# Patient Record
(demographics unavailable — no encounter records)

---

## 2024-12-13 NOTE — REASON FOR VISIT
[Follow-Up - Clinic] : a clinic follow-up of [FreeTextEntry2] :  vascular follow up [FreeTextEntry1] : 12/13/2024   She reports she was told she was pregnant end of june.   Had a miscarrage  with vaginal bleeding Bled for 17 days Was not seen by GYN Currently not pregnant.   She is taking aspirin sometimes Breathing is short - on and off.  She has chest pains at times.  She has leg swelling She is traveling [place] by airplane to help with her dad - he is sick with cancer SHe takes eliquis prior to the flights.    6/13/2024 Has been on aspirin 81mg weekly  no other meds She had a fall April 21st, was coming out of the tub , put out her left foot first, fell, broke her fall with the LEFT hand L wrist started to swell Went to [facility] ER, xray was done of the L wrist, was found to have a fracture. She is going to see a hand surgeon tomorrow She was placed in a brace, which she has been wearing for 6 weeks Pain is better  A door hit her in the R anterior chest  Taking tylenol for pain She took a flight May 31, to [place], took 1 tablet Eliquis 2.5mg 1 :45 prior.  She got on the plane, when it took off, she felt burning in the chest 3 days in [place], she was fine  Then flight back she experienced the pain again. Went to [facility] ER.  - She had an ECG, and xray.   She had labs which were ok .  CT was not done Breathing is ok   june 4 labs ck 96 trop 0.012 dimer <150  12/8/2023  Dong well walking no ore brace on eliquis 2.5mg BID Venous duplex Dec 6th showed no DVT no travel plans   9/8  Denies C/P or SOB. No LE pain other than L ankle discomfort and swelling, stable. No longer in a brace. Ambulating well.   3/10/2023 Followup visit Duplex Feb 2023:  No DVT Echo July 2022:  Normal LV function  Remains on eliquis 5mg BID She is STILL in a immobilization boot on the left She follows with orthopedics in [place]. No blood in the urine or stool No skipping any doses.  She has tenderness to palpation of the chest.    11/29/22  Reports breathing improved. Reports tenderness to chest wall.  Reports decreased swelling and pain to L ankle.  Still wearing L ace wrap and brace. No blood in urine or stool.  Anticardiolipin IgM less than 12 in August. D-dimer and cardiac biomarkers negative in August.  LE venous duplex in 8/2022 showed no acute DVT. Chronic changes in L peroneal vein.  Reports she is going to have an MRI with orthopedics in the near future.  Depending on findings she may need elective ankle surgery.  Medication: Eliquis 5 mg BID  8/26  R sided intermittent C/P, independent of rest or activity. Non-radiating. On this past Monday, lasted for 20 minutes before resolving. Reports at times happens when sleeping, waking her. Notes fatigue. Reports dyspnea on exertion on ambulation of less than 1 block. Reports dyspnea unchanged since last visit.  Denies any bleeding issues on Eliquis. This month menstruation, last 11 days, longer than usual.  Reports L ankle pain and swelling pain improved since last visit. Has started Physical Therapy. Has a visit with Orthopedics next week.    6/28  41 y/o F PMH of Chronic Abdominal Pain with recent L LE injury presented with SOB and found to have LLE DVT (left popliteal veins and tibioperoneal trunk) and  saddle PE. patient initially started on heparin gtt later transitioned to Eliquis with run-in. Echo showed Right ventricular enlargement with normal right ventricular systolic function, borderline PA pressures. Biomarkers were negative at [facility] abdomen showed no thrombus in iliac veins but test was incomplete because patient could not tolerate further.. DVT/PE likely provoked in setting of OCP (Xulane Patch) use and ankle injury. MRI ankle showed high-grade tears of the ATFL and CFL and sprain of the deltoid ligament with a crescentic hematoma overlying the distal fibula. patient evaluated by ortho and recommended no surgical intervention and an ankle brace/aircast with use of crutches for mobility. Patient was admitted on 6/9 and discharged on 6/15.   Currently no C/P.  Reports her JOHNSON is markedly improved post discharge.   JOHNSON on exertion noted on climbing 4 steps (improving). In L ankle splint and ACE. Currently using crutches. Denies current dizziness or palpitations.   Denies any melena or hematuria.   Reports menses usually 3 days, currently 8 days.   Med Reconciliation:  Eliquis 5 mg BID

## 2024-12-13 NOTE — PHYSICAL EXAM
[Respiration, Rhythm And Depth] : normal respiratory rhythm and effort [Auscultation Breath Sounds / Voice Sounds] : lungs were clear to auscultation bilaterally [Heart Rate And Rhythm] : heart rate and rhythm were normal [Heart Sounds] : normal S1 and S2 [Arterial Pulses Normal] : the arterial pulses were normal [Bowel Sounds] : normal bowel sounds [Abdomen Soft] : soft [Abdomen Tenderness] : non-tender [Abnormal Walk] : normal gait [Cyanosis, Localized] : no localized cyanosis [] : no rash [No Skin Ulcers] : no skin ulcer [Oriented To Time, Place, And Person] : oriented to person, place, and time [FreeTextEntry1] : Palpable DP / PT bilaterally, trace L ankle edema

## 2024-12-13 NOTE — ASSESSMENT
[FreeTextEntry1] : Assessment: 1. H/o Saddle PE in 6/2022  VSS  Biomarkers negative at [facility]  RV enlarged, normal RV function - now with normal RV on repeat TTE  Occurred in setting of OCP use and L ankle injury 2. L LE DVT  popliteal vein and tibioperoneal trunk  improved on last venous duplex 3. L Ankle Sprain and ligament injury  Post fall 4. HLD - on diet modification  Plan:  1.  She had recent pregnancy and miscarriage, recent flights, new onset of chest pain and shortness of breath - prior saddle pe, I am concerned about recurrance of PE 2.  Will send to ER for rapid assessment:  Venous duplex, Dimer, BNP, trop and CTA chest.

## 2025-01-31 NOTE — ASSESSMENT
[FreeTextEntry1] : Assessment: 1. H/o Saddle PE in 6/2022  VSS  Biomarkers negative at [facility]  RV enlarged, normal RV function - now with normal RV on repeat TTE  Occurred in setting of OCP use and L ankle injury 2. L LE DVT  popliteal vein and tibioperoneal trunk  improved on last venous duplex 3. L Ankle Sprain and ligament injury  Post fall 4. HLD - on diet modification  Plan:  1. History of VTE: ASA 81 mg daily. 2. L Calf Discomfort: Repeat LE venous duplex due to history of DVT. 3. History of chest discomfort: Repeat CTA chest negative for PE. Negative Troponin. Check repeat TTE. 4. Travel prophylaxis: Eliquis 2.5 mg one tab one hour prior to flight. 5. Health Maintenance: Healthy diet and exercise.  6. Follow-up in 3-6 months. Call with any questions. Office will call with test results.     I, Dr. Handy Gerardo, personally performed the evaluation and management (E/M) services for this established patient who presents today.  That E/M includes conducting the examination, assessing all new/exacerbated conditions, and establishing a new plan of care.  Today, my ACP, Vijaya Moreno, was here to observe my evaluation and management services for this new problem/exacerbated condition to be followed going forward.

## 2025-01-31 NOTE — REASON FOR VISIT
[FreeTextEntry2] :  vascular follow up [FreeTextEntry1] : 1/31/2025  Since last visit our pleasant patient reports visit to ER 12/2024. CTA chest negative for PE. R LE venous duplex negative.   Medications: ASA 81 mg daily.  12/13/2024   She reports she was told she was pregnant end of june.   Had a miscarrage  with vaginal bleeding Bled for 17 days Was not seen by GYN Currently not pregnant.   She is taking aspirin sometimes Breathing is short - on and off.  She has chest pains at times.  She has leg swelling She is traveling [place] by airplane to help with her dad - he is sick with cancer SHe takes eliquis prior to the flights.    6/13/2024 Has been on aspirin 81mg weekly  no other meds She had a fall April 21st, was coming out of the tub , put out her left foot first, fell, broke her fall with the LEFT hand L wrist started to swell Went to [facility] ER, xray was done of the L wrist, was found to have a fracture. She is going to see a hand surgeon tomorrow She was placed in a brace, which she has been wearing for 6 weeks Pain is better  A door hit her in the R anterior chest  Taking tylenol for pain She took a flight May 31, to [place], took 1 tablet Eliquis 2.5mg 1 :45 prior.  She got on the plane, when it took off, she felt burning in the chest 3 days in [place], she was fine  Then flight back she experienced the pain again. Went to [facility] ER.  - She had an ECG, and xray.   She had labs which were ok .  CT was not done Breathing is ok   june 4 labs ck 96 trop 0.012 dimer <150  12/8/2023  Dong well walking no ore brace on eliquis 2.5mg BID Venous duplex Dec 6th showed no DVT no travel plans   9/8  Denies C/P or SOB. No LE pain other than L ankle discomfort and swelling, stable. No longer in a brace. Ambulating well.   3/10/2023 Followup visit Duplex Feb 2023:  No DVT Echo July 2022:  Normal LV function  Remains on eliquis 5mg BID She is STILL in a immobilization boot on the left She follows with orthopedics in [place]. No blood in the urine or stool No skipping any doses.  She has tenderness to palpation of the chest.    11/29/22  Reports breathing improved. Reports tenderness to chest wall.  Reports decreased swelling and pain to L ankle.  Still wearing L ace wrap and brace. No blood in urine or stool.  Anticardiolipin IgM less than 12 in August. D-dimer and cardiac biomarkers negative in August.  LE venous duplex in 8/2022 showed no acute DVT. Chronic changes in L peroneal vein.  Reports she is going to have an MRI with orthopedics in the near future.  Depending on findings she may need elective ankle surgery.  Medication: Eliquis 5 mg BID  8/26  R sided intermittent C/P, independent of rest or activity. Non-radiating. On this past Monday, lasted for 20 minutes before resolving. Reports at times happens when sleeping, waking her. Notes fatigue. Reports dyspnea on exertion on ambulation of less than 1 block. Reports dyspnea unchanged since last visit.  Denies any bleeding issues on Eliquis. This month menstruation, last 11 days, longer than usual.  Reports L ankle pain and swelling pain improved since last visit. Has started Physical Therapy. Has a visit with Orthopedics next week.    6/28  41 y/o F PMH of Chronic Abdominal Pain with recent L LE injury presented with SOB and found to have LLE DVT (left popliteal veins and tibioperoneal trunk) and  saddle PE. patient initially started on heparin gtt later transitioned to Eliquis with run-in. Echo showed Right ventricular enlargement with normal right ventricular systolic function, borderline PA pressures. Biomarkers were negative at [facility] abdomen showed no thrombus in iliac veins but test was incomplete because patient could not tolerate further.. DVT/PE likely provoked in setting of OCP (Xulane Patch) use and ankle injury. MRI ankle showed high-grade tears of the ATFL and CFL and sprain of the deltoid ligament with a crescentic hematoma overlying the distal fibula. patient evaluated by ortho and recommended no surgical intervention and an ankle brace/aircast with use of crutches for mobility. Patient was admitted on 6/9 and discharged on 6/15.   Currently no C/P.  Reports her JOHNSON is markedly improved post discharge.   JOHNSON on exertion noted on climbing 4 steps (improving). In L ankle splint and ACE. Currently using crutches. Denies current dizziness or palpitations.   Denies any melena or hematuria.   Reports menses usually 3 days, currently 8 days.   Med Reconciliation:  Eliquis 5 mg BID

## 2025-01-31 NOTE — REASON FOR VISIT
[FreeTextEntry2] :  vascular follow up [FreeTextEntry1] : 1/31/2025  Since last visit our pleasant patient reports visit to ER 12/2024. CTA chest negative for PE. R LE venous duplex negative.   Medications: ASA 81 mg daily.  12/13/2024   She reports she was told she was pregnant end of june.   Had a miscarrage  with vaginal bleeding Bled for 17 days Was not seen by GYN Currently not pregnant.   She is taking aspirin sometimes Breathing is short - on and off.  She has chest pains at times.  She has leg swelling She is traveling [place] by airplane to help with her dad - he is sick with cancer SHe takes eliquis prior to the flights.    6/13/2024 Has been on aspirin 81mg weekly  no other meds She had a fall April 21st, was coming out of the tub , put out her left foot first, fell, broke her fall with the LEFT hand L wrist started to swell Went to [facility] ER, xray was done of the L wrist, was found to have a fracture. She is going to see a hand surgeon tomorrow She was placed in a brace, which she has been wearing for 6 weeks Pain is better  A door hit her in the R anterior chest  Taking tylenol for pain She took a flight May 31, to [place], took 1 tablet Eliquis 2.5mg 1 :45 prior.  She got on the plane, when it took off, she felt burning in the chest 3 days in [place], she was fine  Then flight back she experienced the pain again. Went to [facility] ER.  - She had an ECG, and xray.   She had labs which were ok .  CT was not done Breathing is ok   june 4 labs ck 96 trop 0.012 dimer <150  12/8/2023  Dong well walking no ore brace on eliquis 2.5mg BID Venous duplex Dec 6th showed no DVT no travel plans   9/8  Denies C/P or SOB. No LE pain other than L ankle discomfort and swelling, stable. No longer in a brace. Ambulating well.   3/10/2023 Followup visit Duplex Feb 2023:  No DVT Echo July 2022:  Normal LV function  Remains on eliquis 5mg BID She is STILL in a immobilization boot on the left She follows with orthopedics in [place]. No blood in the urine or stool No skipping any doses.  She has tenderness to palpation of the chest.    11/29/22  Reports breathing improved. Reports tenderness to chest wall.  Reports decreased swelling and pain to L ankle.  Still wearing L ace wrap and brace. No blood in urine or stool.  Anticardiolipin IgM less than 12 in August. D-dimer and cardiac biomarkers negative in August.  LE venous duplex in 8/2022 showed no acute DVT. Chronic changes in L peroneal vein.  Reports she is going to have an MRI with orthopedics in the near future.  Depending on findings she may need elective ankle surgery.  Medication: Eliquis 5 mg BID  8/26  R sided intermittent C/P, independent of rest or activity. Non-radiating. On this past Monday, lasted for 20 minutes before resolving. Reports at times happens when sleeping, waking her. Notes fatigue. Reports dyspnea on exertion on ambulation of less than 1 block. Reports dyspnea unchanged since last visit.  Denies any bleeding issues on Eliquis. This month menstruation, last 11 days, longer than usual.  Reports L ankle pain and swelling pain improved since last visit. Has started Physical Therapy. Has a visit with Orthopedics next week.    6/28  43 y/o F PMH of Chronic Abdominal Pain with recent L LE injury presented with SOB and found to have LLE DVT (left popliteal veins and tibioperoneal trunk) and  saddle PE. patient initially started on heparin gtt later transitioned to Eliquis with run-in. Echo showed Right ventricular enlargement with normal right ventricular systolic function, borderline PA pressures. Biomarkers were negative at [facility] abdomen showed no thrombus in iliac veins but test was incomplete because patient could not tolerate further.. DVT/PE likely provoked in setting of OCP (Xulane Patch) use and ankle injury. MRI ankle showed high-grade tears of the ATFL and CFL and sprain of the deltoid ligament with a crescentic hematoma overlying the distal fibula. patient evaluated by ortho and recommended no surgical intervention and an ankle brace/aircast with use of crutches for mobility. Patient was admitted on 6/9 and discharged on 6/15.   Currently no C/P.  Reports her JOHNSON is markedly improved post discharge.   JOHNSON on exertion noted on climbing 4 steps (improving). In L ankle splint and ACE. Currently using crutches. Denies current dizziness or palpitations.   Denies any melena or hematuria.   Reports menses usually 3 days, currently 8 days.   Med Reconciliation:  Eliquis 5 mg BID

## 2025-01-31 NOTE — ASSESSMENT
[FreeTextEntry1] : Assessment: 1. H/o Saddle PE in 6/2022  VSS  Biomarkers negative at [facility]  RV enlarged, normal RV function - now with normal RV on repeat TTE  Occurred in setting of OCP use and L ankle injury 2. L LE DVT  popliteal vein and tibioperoneal trunk  improved on last venous duplex 3. L Ankle Sprain and ligament injury  Post fall 4. HLD - on diet modification  Plan:  1. History of VTE: ASA 81 mg daily. 2. L Calf Discomfort: Repeat LE venous duplex due to history of DVT. 3. History of chest discomfort: Repeat CTA chest negative for PE. Negative Troponin. Check repeat TTE. 4. Travel prophylaxis: Eliquis 2.5 mg one tab one hour prior to flight. 5. Health Maintenance: Healthy diet and exercise.  6. Follow-up in 3-6 months. Call with any questions. Office will call with test results.     I, Dr. Handy Gerardo, personally performed the evaluation and management (E/M) services for this established patient who presents today.  That E/M includes conducting the examination, assessing all new/exacerbated conditions, and establishing a new plan of care.  Today, my ACP, Vijaya Moreno, was here to observe my evaluation and management services for this new problem/exacerbated condition to be followed going forward.

## 2025-07-30 NOTE — PHYSICAL EXAM
[Respiration, Rhythm And Depth] : normal respiratory rhythm and effort [Auscultation Breath Sounds / Voice Sounds] : lungs were clear to auscultation bilaterally [Heart Rate And Rhythm] : heart rate and rhythm were normal [Heart Sounds] : normal S1 and S2 [Arterial Pulses Normal] : the arterial pulses were normal [Abdomen Soft] : soft [Bowel Sounds] : normal bowel sounds [Abdomen Tenderness] : non-tender [Abnormal Walk] : normal gait [Cyanosis, Localized] : no localized cyanosis [FreeTextEntry1] : Palpable DP / PT bilaterally, trace L ankle edema [] : no rash [No Skin Ulcers] : no skin ulcer [Oriented To Time, Place, And Person] : oriented to person, place, and time

## 2025-07-30 NOTE — REASON FOR VISIT
[Follow-Up - Clinic] : a clinic follow-up of [FreeTextEntry2] :  vascular follow up [FreeTextEntry1] : 8/1/2025  Since last visit our shaye patient reports...  1/31/2025  Since last visit our shaye patient reports visit to ER 12/2024. CTA chest negative for PE. R LE venous duplex negative.   Medications: ASA 81 mg daily.  12/13/2024   She reports she was told she was pregnant end of june.   Had a miscarrage  with vaginal bleeding Bled for 17 days Was not seen by GYN Currently not pregnant.   She is taking aspirin sometimes Breathing is short - on and off.  She has chest pains at times.  She has leg swelling She is traveling [place] by airplane to help with her dad - he is sick with cancer SHe takes eliquis prior to the flights.    6/13/2024 Has been on aspirin 81mg weekly  no other meds She had a fall April 21st, was coming out of the tub , put out her left foot first, fell, broke her fall with the LEFT hand L wrist started to swell Went to [facility] ER, xray was done of the L wrist, was found to have a fracture. She is going to see a hand surgeon tomorrow She was placed in a brace, which she has been wearing for 6 weeks Pain is better  A door hit her in the R anterior chest  Taking tylenol for pain She took a flight May 31, to [place], took 1 tablet Eliquis 2.5mg 1 :45 prior.  She got on the plane, when it took off, she felt burning in the chest 3 days in [place], she was fine  Then flight back she experienced the pain again. Went to [facility] ER.  - She had an ECG, and xray.   She had labs which were ok .  CT was not done Breathing is ok   june 4 labs ck 96 trop 0.012 dimer <150  12/8/2023  Dong well walking no ore brace on eliquis 2.5mg BID Venous duplex Dec 6th showed no DVT no travel plans   9/8  Denies C/P or SOB. No LE pain other than L ankle discomfort and swelling, stable. No longer in a brace. Ambulating well.   3/10/2023 Followup visit Duplex Feb 2023:  No DVT Echo July 2022:  Normal LV function  Remains on eliquis 5mg BID She is STILL in a immobilization boot on the left She follows with orthopedics in [place]. No blood in the urine or stool No skipping any doses.  She has tenderness to palpation of the chest.    11/29/22  Reports breathing improved. Reports tenderness to chest wall.  Reports decreased swelling and pain to L ankle.  Still wearing L ace wrap and brace. No blood in urine or stool.  Anticardiolipin IgM less than 12 in August. D-dimer and cardiac biomarkers negative in August.  LE venous duplex in 8/2022 showed no acute DVT. Chronic changes in L peroneal vein.  Reports she is going to have an MRI with orthopedics in the near future.  Depending on findings she may need elective ankle surgery.  Medication: Eliquis 5 mg BID  8/26  R sided intermittent C/P, independent of rest or activity. Non-radiating. On this past Monday, lasted for 20 minutes before resolving. Reports at times happens when sleeping, waking her. Notes fatigue. Reports dyspnea on exertion on ambulation of less than 1 block. Reports dyspnea unchanged since last visit.  Denies any bleeding issues on Eliquis. This month menstruation, last 11 days, longer than usual.  Reports L ankle pain and swelling pain improved since last visit. Has started Physical Therapy. Has a visit with Orthopedics next week.    6/28  43 y/o F PMH of Chronic Abdominal Pain with recent L LE injury presented with SOB and found to have LLE DVT (left popliteal veins and tibioperoneal trunk) and  saddle PE. patient initially started on heparin gtt later transitioned to Eliquis with run-in. Echo showed Right ventricular enlargement with normal right ventricular systolic function, borderline PA pressures. Biomarkers were negative at [facility] abdomen showed no thrombus in iliac veins but test was incomplete because patient could not tolerate further.. DVT/PE likely provoked in setting of OCP (Xulane Patch) use and ankle injury. MRI ankle showed high-grade tears of the ATFL and CFL and sprain of the deltoid ligament with a crescentic hematoma overlying the distal fibula. patient evaluated by ortho and recommended no surgical intervention and an ankle brace/aircast with use of crutches for mobility. Patient was admitted on 6/9 and discharged on 6/15.   Currently no C/P.  Reports her JOHNSON is markedly improved post discharge.   JOHNSON on exertion noted on climbing 4 steps (improving). In L ankle splint and ACE. Currently using crutches. Denies current dizziness or palpitations.   Denies any melena or hematuria.   Reports menses usually 3 days, currently 8 days.   Med Reconciliation:  Eliquis 5 mg BID

## 2025-07-30 NOTE — ASSESSMENT
[FreeTextEntry1] : Assessment: 1. H/o Saddle PE in 6/2022  VSS  Biomarkers negative at [facility]  RV enlarged, normal RV function - now with normal RV on repeat TTE  Occurred in setting of OCP use and L ankle injury 2. L LE DVT  popliteal vein and tibioperoneal trunk  improved on last venous duplex 3. L Ankle Sprain and ligament injury  Post fall 4. HLD - on diet modification  Plan:  1. History of VTE: ASA 81 mg daily. 2. L Calf Discomfort: Repeat LE venous duplex due to history of DVT. 3. History of chest discomfort: Repeat CTA chest negative for PE. Negative Troponin. Check repeat TTE. 4. Travel prophylaxis: Eliquis 2.5 mg one tab one hour prior to flight. 5. Health Maintenance: Healthy diet and exercise.  6. Follow-up in 3-6 months. Call with any questions. Office will call with test results.     I, Dr. Handy Gerardo, personally performed the evaluation and management (E/M) services for this established patient who presents today.  That E/M includes conducting the examination, assessing all new/exacerbated conditions, and establishing a new plan of care.  Today, my ACP, Vijaya Moreno, was here to observe my evaluation and management services for this new problem/exacerbated condition to be followed going forward.